# Patient Record
Sex: FEMALE | Race: WHITE | NOT HISPANIC OR LATINO | URBAN - METROPOLITAN AREA
[De-identification: names, ages, dates, MRNs, and addresses within clinical notes are randomized per-mention and may not be internally consistent; named-entity substitution may affect disease eponyms.]

---

## 2024-11-16 ENCOUNTER — APPOINTMENT (OUTPATIENT)
Dept: URGENT CARE | Age: 29
End: 2024-11-16

## 2025-03-15 ENCOUNTER — OFFICE VISIT (OUTPATIENT)
Dept: URGENT CARE | Age: 30
End: 2025-03-15
Payer: MEDICAID

## 2025-03-15 ENCOUNTER — ANCILLARY PROCEDURE (OUTPATIENT)
Dept: URGENT CARE | Age: 30
End: 2025-03-15
Payer: MEDICAID

## 2025-03-15 VITALS
DIASTOLIC BLOOD PRESSURE: 71 MMHG | RESPIRATION RATE: 20 BRPM | WEIGHT: 136 LBS | TEMPERATURE: 98.1 F | BODY MASS INDEX: 24.87 KG/M2 | SYSTOLIC BLOOD PRESSURE: 116 MMHG | OXYGEN SATURATION: 98 % | HEART RATE: 73 BPM

## 2025-03-15 DIAGNOSIS — M79.642 PAIN IN LEFT HAND: Primary | ICD-10-CM

## 2025-03-15 DIAGNOSIS — M79.642 PAIN IN LEFT HAND: ICD-10-CM

## 2025-03-15 PROCEDURE — 73130 X-RAY EXAM OF HAND: CPT | Mod: LEFT SIDE | Performed by: PHYSICIAN ASSISTANT

## 2025-03-15 NOTE — PROGRESS NOTES
Subjective   Patient ID: Adelia Villafana is a 29 y.o. female. They present today with a chief complaint of Injury (Monday patient states she smashed her hand, then on Wends pushed on it to get up and felt a pain in between her 1st and 2nd finger ).    History of Present Illness  Patient is a 29-year-old female who presents with left hand injury.  States that Monday she smashed her hand.  A couple of days later she pushed up on it to get up and felt a pain between her first and second digit.  Denies any numbness, tingling.  She is right-handed.    Past Medical History  Allergies as of 03/15/2025    (No Known Allergies)       (Not in a hospital admission)       History reviewed. No pertinent past medical history.    History reviewed. No pertinent surgical history.     reports that she has been smoking cigarettes. She started smoking about 2 months ago. She has never used smokeless tobacco. She reports that she does not currently use drugs.    Review of Systems  ROS is negative unless otherwise stated in HPI.         Objective    Vitals:    03/15/25 0915   BP: 116/71   BP Location: Left arm   Patient Position: Sitting   BP Cuff Size: Adult   Pulse: 73   Resp: 20   Temp: 36.7 °C (98.1 °F)   TempSrc: Oral   SpO2: 98%   Weight: 61.7 kg (136 lb)     Patient's last menstrual period was 03/06/2025 (approximate).      VS: As documented in the triage note and EMR flowsheet from this visit was reviewed  General: Well appearing. No acute distress.   Eyes:  Extraocular movements grossly intact. No scleral icterus.   Head: Atraumatic. Normocephalic.     Neck: No meningismus. No gross masses. Full movement through range of motion  CV: Regular rhythm. No murmurs, rubs, gallops appreciated.   Resp: Clear to auscultation bilaterally. No respiratory distress.    MSK: Symmetric muscle bulk. No gross step offs or deformities.  Mild tenderness to palpation to the dorsum of the left hand.  No anatomic snuffbox tenderness.  Distal  sensation intact.  2+ radial pulse.  Skin: Warm, dry. No rashes  Neuro: CN II-VII intact. A&O x3. Speech fluent. Alert. Moving all extremities. Ambulates with normal gait  Psych: Appropriate mood and affect for situation      Point of Care Test & Imaging Results from this visit  No results found for this visit on 03/15/25.   XR hand left 3+ views    Result Date: 3/15/2025  Interpreted By:  Waldemar Cowan, STUDY: XR HAND LEFT 3+ VIEWS; ;  3/15/2025 9:26 am   INDICATION: Signs/Symptoms:injury.   ,M79.642 Pain in left hand   COMPARISON: None.   ACCESSION NUMBER(S): [id number]   ORDERING CLINICIAN: MARTA CADE   FINDINGS: No fracture dislocation or bone lesion. Joint space is normal.       No abnormality.     MACRO: None   Signed by: Waldemar Cowan 3/15/2025 9:42 AM Dictation workstation:   [id number]     Diagnostic study results (if any) were reviewed by Marta Cade PA-C.    Assessment/Plan   Allergies, medications, history, and pertinent labs/EKGs/Imaging reviewed by Marta Cade PA-C.     Medical Decision Making  Patient is a 21-year-old female presents with left hand injury.  On examination, has tenderness palpation the dorsum of the left hand.  Distal sensation pulses intact.  X-ray showed no fracture or dislocation.  Advised on rest, ice, elevation and NSAID use.  Administered Ace bandage.    Orders and Diagnoses  Diagnoses and all orders for this visit:  Pain in left hand  -     XR hand left 3+ views; Future      Medical Admin Record      Patient disposition: Home    Electronically signed by Marta Cade PA-C  10:19 AM

## 2025-06-11 ENCOUNTER — OFFICE VISIT (OUTPATIENT)
Dept: URGENT CARE | Age: 30
End: 2025-06-11
Payer: MEDICAID

## 2025-06-11 VITALS
BODY MASS INDEX: 24.87 KG/M2 | DIASTOLIC BLOOD PRESSURE: 64 MMHG | WEIGHT: 136 LBS | RESPIRATION RATE: 14 BRPM | SYSTOLIC BLOOD PRESSURE: 96 MMHG | TEMPERATURE: 97.6 F | OXYGEN SATURATION: 98 % | HEART RATE: 81 BPM

## 2025-06-11 DIAGNOSIS — B34.8 RHINOVIRUS: ICD-10-CM

## 2025-06-11 DIAGNOSIS — R09.81 CONGESTION OF NASAL SINUS: Primary | ICD-10-CM

## 2025-06-11 LAB
POC CORONAVIRUS SARS-COV-2 PCR: NEGATIVE
POC HUMAN RHINOVIRUS PCR: POSITIVE
POC INFLUENZA A VIRUS PCR: NEGATIVE
POC INFLUENZA B VIRUS PCR: NEGATIVE
POC RESPIRATORY SYNCYTIAL VIRUS PCR: NEGATIVE

## 2025-06-11 PROCEDURE — 87631 RESP VIRUS 3-5 TARGETS: CPT | Performed by: NURSE PRACTITIONER

## 2025-06-11 PROCEDURE — 99213 OFFICE O/P EST LOW 20 MIN: CPT | Performed by: NURSE PRACTITIONER

## 2025-06-11 RX ORDER — NORGESTIMATE AND ETHINYL ESTRADIOL 0.25-0.035
1 KIT ORAL DAILY
COMMUNITY
Start: 2025-06-02

## 2025-06-11 NOTE — PROGRESS NOTES
Subjective   Patient ID: Adelia Villafana is a 30 y.o. female. They present today with a chief complaint of Nasal Congestion (Started 2-3 days ago, nasal congestion, sore throat.//Took advil sinus and allergy at 7am and 11:30am).    History of Present Illness  Patient presents with runny nose, congestion, ear popping and pressure and sore throat with a cough for approximately 2 to 3 days. Since her cough is more related to the congestion and drainage. Tonight's fever, chills and fatigue. She is taking Advil.     Past Medical History  Allergies as of 06/11/2025    (No Known Allergies)       Prescriptions Prior to Admission[1]     Medical History[2]    Surgical History[3]     reports that she has been smoking cigarettes. She started smoking about 5 months ago. She has never used smokeless tobacco. She reports that she does not currently use drugs.    Review of Systems  Review of Systems     See HPI                          Objective    Vitals:    06/11/25 1702   BP: 96/64   BP Location: Left arm   Patient Position: Sitting   BP Cuff Size: Adult long   Pulse: 81   Resp: 14   Temp: 36.4 °C (97.6 °F)   TempSrc: Oral   SpO2: 98%   Weight: 61.7 kg (136 lb)     No LMP recorded.    Physical Exam  CONSTITUTIONAL: The general appearance and condition of the patient were examined.  Level of distress, nutrition, external development abnormality, and general behavior were noted.  No abnormal findings.  Vital signs as documented.      ENT: External ears: left ear normal ; right ear normal. Bilateral ears have bulging TM's.  Normal external ear exam.  Bilateral swelling and redness to nasal turbinate's.  Erythema with pebbling to throat.         CARDIOVASCULAR: The patient's heart examined for regular rate and rhythm and presence of murmurs. Note taken of any tachycardia, bradycardia or any irregular rhythm.  No abnormal findings.        RESPIRATORY/LUNGS: Chest examined for equal movement, bilaterally.  Lungs examined for  equality of breath sounds. Presence or absence of rales noted bilaterally.  Examined for the presence of diffuse or scattered wheezes.  No abnormal findings.      Procedures    Point of Care Test & Imaging Results from this visit  Results for orders placed or performed in visit on 06/11/25   POCT SPOTFIRE R/ST Panel Mini w/COVID ([facility]) manually resulted    Specimen: Swab   Result Value Ref Range    POC Sars-Cov-2 PCR Negative Negative    POC Respiratory Syncytial Virus PCR Negative Negative    POC Influenza A Virus PCR Negative Negative    POC Influenza B Virus PCR Negative Negative    POC Human Rhinovirus PCR Positive (A) Negative      Imaging  No results found.    Cardiology, Vascular, and Other Imaging  No other imaging results found for the past 2 days      Diagnostic study results (if any) were reviewed by HOLA Kenyon.    Assessment/Plan   Allergies, medications, history, and pertinent labs/EKGs/Imaging reviewed by HOLA Kenyon.     Medical Decision Making  Patient Instructions   Supportive care - encourage clear fluids ( water, Pedialyte, ) , chicken broth/soup and warm fluids can be soothing as well.  Rest, adjust room temperature and humidity.  Use saline spray/drops as needed.  Tylenol or Motrin if needed for fever.   Follow up with PCP if you are not feeling any better.    At time of discharge patient was clinically well-appearing and HDS for outpatient management. The patient and/or family was educated regarding diagnosis, supportive care, OTC and Rx medications. The patient and/or family was given the opportunity to ask questions prior to discharge.  They verbalized understanding of my discussion of the plans for treatment, expected course, indications to return to  or seek further evaluation in ED, and the need for timely follow up as directed.   They were provided with a work/school excuse if requested.    Orders and Diagnoses  Diagnoses and all orders for this  visit:  Congestion of nasal sinus  -     POCT SPOTFIRE R/ST Panel Mini w/COVID ([facility]) manually resulted  Rhinovirus      Medical Admin Record      Patient disposition: Home    Electronically signed by HOLA Kenyon  5:28 PM           [1] (Not in a hospital admission)   [2] No past medical history on file.  [3] No past surgical history on file.

## 2025-07-15 ENCOUNTER — OFFICE VISIT (OUTPATIENT)
Dept: URGENT CARE | Age: 30
End: 2025-07-15
Payer: MEDICAID

## 2025-07-15 VITALS
RESPIRATION RATE: 20 BRPM | BODY MASS INDEX: 24.69 KG/M2 | DIASTOLIC BLOOD PRESSURE: 84 MMHG | SYSTOLIC BLOOD PRESSURE: 130 MMHG | HEART RATE: 62 BPM | WEIGHT: 135 LBS | OXYGEN SATURATION: 97 % | TEMPERATURE: 98.1 F

## 2025-07-15 DIAGNOSIS — S69.91XA INJURY OF FINGER OF RIGHT HAND, INITIAL ENCOUNTER: Primary | ICD-10-CM
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
## 2025-07-15 ASSESSMENT — ENCOUNTER SYMPTOMS: WOUND: 1

## 2025-07-15 NOTE — PATIENT INSTRUCTIONS
Once you have the ring removed, soak the finger in soapy water  Cleanse your wound, apply antibiotic ointment and band aid, if laceration looks like it needs stitches, return to UC

## 2025-07-15 NOTE — PROGRESS NOTES
Subjective   Patient ID: Adelia Villafana is a 30 y.o. female. They present today with a chief complaint of ring stuck on finger (Pt cut her finger today and is swollen causing ring finger to be stuck).    History of Present Illness  See mdm      History provided by:  Patient      Past Medical History  Allergies as of 07/15/2025    (No Known Allergies)       Prescriptions Prior to Admission[1]     Medical History[2]    Surgical History[3]     reports that she has been smoking cigarettes. She started smoking about 6 months ago. She has never used smokeless tobacco. She reports that she does not currently use drugs.    Review of Systems  Review of Systems   Skin:  Positive for wound.   All other systems reviewed and are negative.                                 Objective    Vitals:    07/15/25 1500   BP: 130/84   BP Location: Left arm   Patient Position: Sitting   BP Cuff Size: Adult   Pulse: 62   Resp: 20   Temp: 36.7 °C (98.1 °F)   TempSrc: Oral   SpO2: 97%   Weight: 61.2 kg (135 lb)     Patient's last menstrual period was 07/01/2025 (approximate).    Physical Exam  Vitals and nursing note reviewed.   Musculoskeletal:      Right hand: Swelling and tenderness present.        Hands:          Procedures    Point of Care Test & Imaging Results from this visit  No results found for this visit on 07/15/25.   Imaging  No results found.    Cardiology, Vascular, and Other Imaging  No other imaging results found for the past 2 days      Diagnostic study results (if any) were reviewed by Crystal L Severino, APRN-CNP.    Assessment/Plan   Allergies, medications, history, and pertinent labs/EKGs/Imaging reviewed by Crystal L Severino, APRN-CNP.     Medical Decision Making  30-year-old female presents stating she injured her right ring finger earlier this afternoon causing an avulsion laceration from her ring.  She states the finger has now swollen to the point that she is unable to get the ring off and she is here to have the  ring cut off plus the laceration looked at.  Right hand is soaked in Hibiclens and water.  I attempted to cut the ring using the electric ring cutter and also the manual ring cutter; worked on the ring for approximately 1 hour and were not able to cut through the center of the ring.  Patient is instructed to go to the ED to see if they have a tool that is strong enough to cut through the ring and have it removed and then she can have the avulsion looked at to see if it needs stitches.  Patient and her friend state they have decided to go to a local fire department to see if they have a tool that can cut the ring off.  Patient states she is UTD on her tetanus.     Orders and Diagnoses  Diagnoses and all orders for this visit:  Injury of finger of right hand, initial encounter  Ring or other jewelry causing external constriction, initial encounter      Medical Admin Record      Patient disposition: Home    Electronically signed by Crystal L Severino, APRN-CNP  4:28 PM           [1] (Not in a hospital admission)  [2] No past medical history on file.  [3] No past surgical history on file.